# Patient Record
Sex: MALE | Race: WHITE
[De-identification: names, ages, dates, MRNs, and addresses within clinical notes are randomized per-mention and may not be internally consistent; named-entity substitution may affect disease eponyms.]

---

## 2019-06-05 ENCOUNTER — HOSPITAL ENCOUNTER (EMERGENCY)
Dept: HOSPITAL 52 - LL.ED | Age: 24
Discharge: HOME | End: 2019-06-05
Payer: SELF-PAY

## 2019-06-05 DIAGNOSIS — J02.9: Primary | ICD-10-CM

## 2019-06-05 DIAGNOSIS — F15.10: ICD-10-CM

## 2019-06-05 LAB
BARBITURATES UR QL SCN: NEGATIVE
BENZODIAZ UR QL SCN: NEGATIVE
CHLORIDE SERPL-SCNC: 100 MMOL/L (ref 98–107)
SODIUM SERPL-SCNC: 135 MMOL/L (ref 136–145)
TCA UR-MCNC: NEGATIVE UG/ML
THC UR QL SCN>50 NG/ML: NEGATIVE

## 2019-06-05 NOTE — EDM.PDOC
ED HPI GENERAL MEDICAL PROBLEM





- General


Chief Complaint: ENT Problem


Stated Complaint: sore throat, fever


Time Seen by Provider: 06/05/19 13:09


Source of Information: Reports: Patient


History Limitations: Reports: No Limitations





- History of Present Illness


INITIAL COMMENTS - FREE TEXT/NARRATIVE: 





Patient comes to ER complaining of severe sore throat, fever, present since 

Monday. 


No nausea/emesis.  Mild runny nose.  No cough/SOB. No other pain complaint. ROS 

otherwise negative.  Reports that he took 8 OTC Ibuprofen at once. 


Treatments PTA: Reports: NSAIDS





- Related Data


 Allergies











Allergy/AdvReac Type Severity Reaction Status Date / Time


 


No Known Allergies Allergy   Verified 06/05/19 12:51











Home Meds: 


 Home Meds





Penicillin V Potassium 500 mg PO BID #20 tablet 06/05/19 [Rx]











Past Medical History





- Past Health History


Medical/Surgical History: Denies Medical/Surgical History


Psychiatric History: Reports: Addiction (Meth), Anxiety, Other (See Below) (

Self harm/cutting in past)





Social & Family History





- Tobacco Use


Smoking Status *Q: Unknown Ever Smoked





- Recreational Drug Use


Recreational Drug Use: Yes


Recreational Drug Type: Reports: Amphetamines (Speed), Marijuana/Hashish, 

Methamphetamine





ED ROS ENT





- Review of Systems


Review Of Systems: ROS reveals no pertinent complaints other than HPI.





ED EXAM, ENT





- Physical Exam


Exam: See Below


Exam Limited By: No Limitations


General Appearance: Alert, Anxious (extremely anxious, crying about having 

blood draw)


Eye Exam: Bilateral Eye: EOMI, PERRL


Ears: Normal External Exam, Normal Canal, Hearing Grossly Normal, Normal TMs


Nose: Normal Inspection


Mouth/Throat: Normal Lips, Pharyngeal Erythema, Tonsillar Erythema, Tonsillar 

Exudates, Tonsillar Swelling.  No: Peritonsillar Mass, Throat Swelling, Tongue 

Swelling, Uvular Deviation, Uvular Edema


Head: Atraumatic, Normocephalic


Neck: Supple, Non-Tender.  No: Lymphadenopathy (L), Lymphadenopathy (R)


Respiratory/Chest: No Respiratory Distress, Lungs Clear, Normal Breath Sounds, 

No Accessory Muscle Use, Chest Non-Tender


Cardiovascular: No Murmur, Tachycardia


GI/Abdominal: Normal Bowel Sounds, Soft, Non-Tender, No Distention


 (Male) Exam: Deferred


Rectal (Males) Exam: Deferred


Back: No: CVA Tenderness (L), CVA Tenderness (R), Muscle Spasm, Paraspinal 

Tenderness, Vertebral Tenderness


Extremities: Normal Range of Motion, Non-Tender, Slow Capillary Refill (mild)


Neurological: Alert, Oriented, No Motor/Sensory Deficits


Psychiatric: Anxious, Tearful


Skin: Warm, Dry, Intact, Normal Color, No Rash





Course





- Vital Signs


Last Recorded V/S: 


 Last Vital Signs











Temp  39.5 C H  06/05/19 12:54


 


Pulse  109 H  06/05/19 12:54


 


Resp  22 H  06/05/19 12:54


 


BP  108/59 L  06/05/19 12:54


 


Pulse Ox  99   06/05/19 12:54














- Orders/Labs/Meds


Orders: 


 Active Orders 24 hr











 Category Date Time Status


 


 Peripheral IV Care [RC] .AS DIRECTED Care  06/05/19 13:00 Active


 


 Sodium Chloride 0.9% [Saline Flush] Med  06/05/19 12:59 Active





 10 ml FLUSH ASDIRECTED PRN   


 


 Peripheral IV Insertion Adult [OM.PC] Routine Oth  06/05/19 12:59 Ordered








 Medication Orders





Sodium Chloride (Saline Flush)  10 ml FLUSH ASDIRECTED PRN


   PRN Reason: Keep Vein Open








Labs: 


 Laboratory Tests











  06/05/19 06/05/19 06/05/19 Range/Units





  14:15 14:15 14:15 


 


WBC  14.9 H    (4.0-10.2)  K/uL


 


RBC  4.58    (4.33-5.41)  M/uL


 


Hgb  15.0    (13.1-16.8)  g/dL


 


Hct  41.9    (39.0-49.0)  %


 


MCV  91.5    (84.0-98.0)  fL


 


MCH  32.8    (28.2-33.3)  pg


 


MCHC  35.8    (31.7-36.0)  g/dL


 


RDW  12.2    (11.2-14.1)  %


 


Plt Count  183    (150-350)  K/uL


 


Neut % (Auto)  71.8    (45.0-80.0)  %


 


Lymph % (Auto)  15.8    (10.0-50.0)  %


 


Mono % (Auto)  12.1    (2.0-14.0)  %


 


Eos % (Auto)  0.1    (0.0-5.0)  %


 


Baso % (Auto)  0.2    (0.0-2.0)  %


 


Neut # (Auto)  10.65 H    (1.40-7.00)  K/uL


 


Lymph # (Auto)  2.35    (0.50-3.50)  K/uL


 


Mono # (Auto)  1.80 H    (0.00-1.00)  K/uL


 


Eos # (Auto)  0.02    (0.00-0.50)  K/uL


 


Baso # (Auto)  0.03    (0.00-0.20)  K/uL


 


Sodium   135 L   (136-145)  mmol/L


 


Potassium   4.4   (3.5-5.1)  mmol/L


 


Chloride   100   ()  mmol/L


 


Carbon Dioxide   24.7   (21.0-32.0)  mmol/L


 


BUN   12   (7-18)  mg/dL


 


Creatinine   0.97   (0.51-1.17)  mg/dL


 


Est Cr Clr Drug Dosing   TNP   


 


Estimated GFR (MDRD)   > 60   mL/min


 


Glucose   93   ()  mg/dL


 


Calcium   9.4   (8.5-10.1)  mg/dL


 


Total Bilirubin   0.7   (0.2-1.0)  mg/dL


 


AST   25   (15-37)  U/L


 


ALT   29   (12-78)  U/L


 


Alkaline Phosphatase   113   ()  IU/L


 


Total Protein   7.7   (6.4-8.2)  g/dL


 


Albumin   3.4   (3.4-5.0)  g/dL


 


Specimen Type     


 


Urine Color     


 


Urine Appearance     


 


Urine pH     (5.0-9.0)  


 


Ur Specific Gravity     (1.005-1.030)  


 


Urine Protein     (NEGATIVE)  mg/dL


 


Urine Glucose (UA)     (NEGATIVE)  mg/dL


 


Urine Ketones     (NEGATIVE)  mg/dL


 


Urine Occult Blood     (NEGATIVE)  


 


Urine Nitrite     (NEGATIVE)  


 


Urine Bilirubin     (NEGATIVE)  


 


Urine Urobilinogen     (0.2-1.0)  E.U./dL


 


Ur Leukocyte Esterase     (NEGATIVE)  


 


Urine RBC     /HPF


 


Urine WBC     /HPF


 


Ur Epithelial Cells     /LPF


 


Urine Bacteria     (NONE TO FEW)  /HPF


 


Urinalysis Comment     


 


Urine Opiates Screen     (NEGATIVE)  


 


Urine Methadone Screen     (NEGATIVE)  


 


U Acetaminophen Screen     (NEGATIVE)  


 


Ur Barbiturates Screen     (NEGATIVE)  


 


Ur Tricyclics Screen     (NEGATIVE)  


 


Ur Phencyclidine Scrn     (NEGATIVE)  


 


Ur Amphetamine Screen     (NEGATIVE)  


 


U Methamphetamines Scrn     (NEGATIVE)  


 


U Benzodiazepines Scrn     (NEGATIVE)  


 


U Cocaine Metab Screen     (NEGATIVE)  


 


U Marijuana (THC) Screen     (NEGATIVE)  


 


Monoscreen    Negative  (NEGATIVE)  














  06/05/19 06/05/19 Range/Units





  16:40 16:56 


 


WBC    (4.0-10.2)  K/uL


 


RBC    (4.33-5.41)  M/uL


 


Hgb    (13.1-16.8)  g/dL


 


Hct    (39.0-49.0)  %


 


MCV    (84.0-98.0)  fL


 


MCH    (28.2-33.3)  pg


 


MCHC    (31.7-36.0)  g/dL


 


RDW    (11.2-14.1)  %


 


Plt Count    (150-350)  K/uL


 


Neut % (Auto)    (45.0-80.0)  %


 


Lymph % (Auto)    (10.0-50.0)  %


 


Mono % (Auto)    (2.0-14.0)  %


 


Eos % (Auto)    (0.0-5.0)  %


 


Baso % (Auto)    (0.0-2.0)  %


 


Neut # (Auto)    (1.40-7.00)  K/uL


 


Lymph # (Auto)    (0.50-3.50)  K/uL


 


Mono # (Auto)    (0.00-1.00)  K/uL


 


Eos # (Auto)    (0.00-0.50)  K/uL


 


Baso # (Auto)    (0.00-0.20)  K/uL


 


Sodium    (136-145)  mmol/L


 


Potassium    (3.5-5.1)  mmol/L


 


Chloride    ()  mmol/L


 


Carbon Dioxide    (21.0-32.0)  mmol/L


 


BUN    (7-18)  mg/dL


 


Creatinine    (0.51-1.17)  mg/dL


 


Est Cr Clr Drug Dosing    


 


Estimated GFR (MDRD)    mL/min


 


Glucose    ()  mg/dL


 


Calcium    (8.5-10.1)  mg/dL


 


Total Bilirubin    (0.2-1.0)  mg/dL


 


AST    (15-37)  U/L


 


ALT    (12-78)  U/L


 


Alkaline Phosphatase    ()  IU/L


 


Total Protein    (6.4-8.2)  g/dL


 


Albumin    (3.4-5.0)  g/dL


 


Specimen Type   Urinblad  


 


Urine Color   Yellow  


 


Urine Appearance   Clear  


 


Urine pH   8.0  (5.0-9.0)  


 


Ur Specific Gravity   1.015  (1.005-1.030)  


 


Urine Protein   30 H  (NEGATIVE)  mg/dL


 


Urine Glucose (UA)   Negative  (NEGATIVE)  mg/dL


 


Urine Ketones   Negative  (NEGATIVE)  mg/dL


 


Urine Occult Blood   Negative  (NEGATIVE)  


 


Urine Nitrite   Negative  (NEGATIVE)  


 


Urine Bilirubin   Negative  (NEGATIVE)  


 


Urine Urobilinogen   0.2  (0.2-1.0)  E.U./dL


 


Ur Leukocyte Esterase   Negative  (NEGATIVE)  


 


Urine RBC   Not seen  /HPF


 


Urine WBC   0-5  /HPF


 


Ur Epithelial Cells   Occasional  /LPF


 


Urine Bacteria   Few  (NONE TO FEW)  /HPF


 


Urinalysis Comment     


 


Urine Opiates Screen  Positive H   (NEGATIVE)  


 


Urine Methadone Screen  Negative   (NEGATIVE)  


 


U Acetaminophen Screen  Negative   (NEGATIVE)  


 


Ur Barbiturates Screen  Negative   (NEGATIVE)  


 


Ur Tricyclics Screen  Negative   (NEGATIVE)  


 


Ur Phencyclidine Scrn  Negative   (NEGATIVE)  


 


Ur Amphetamine Screen  Negative   (NEGATIVE)  


 


U Methamphetamines Scrn  Positive H   (NEGATIVE)  


 


U Benzodiazepines Scrn  Negative   (NEGATIVE)  


 


U Cocaine Metab Screen  Negative   (NEGATIVE)  


 


U Marijuana (THC) Screen  Negative   (NEGATIVE)  


 


Monoscreen    (NEGATIVE)  











Meds: 


Medications











Generic Name Dose Route Start Last Admin





  Trade Name Freq  PRN Reason Stop Dose Admin


 


Sodium Chloride  10 ml  06/05/19 12:59  





  Saline Flush  FLUSH   





  ASDIRECTED PRN   





  Keep Vein Open   





     





     





     














Discontinued Medications














Generic Name Dose Route Start Last Admin





  Trade Name Freq  PRN Reason Stop Dose Admin


 


Diphenhydramine HCl  50 mg  06/05/19 14:20  06/05/19 14:26





  Benadryl  IVPUSH  06/05/19 14:21  50 mg





  ONETIME ONE   Administration





     





     





     





     


 


Sodium Chloride  1,000 mls @ 999 mls/hr  06/05/19 13:10  06/05/19 14:11





  Normal Saline  IV  06/05/19 14:10  999 mls/hr





  .BOLUS ONE   Administration





     





     





     





     


 


Ceftriaxone Sodium 1 gm/  100 mls @ 200 mls/hr  06/05/19 14:18  06/05/19 14:27





  Sodium Chloride  IV  06/05/19 14:47  200 mls/hr





  ONETIME ONE   Administration





     





     





     





     


 


Sodium Chloride  1,000 mls @ 999 mls/hr  06/05/19 14:52  06/05/19 15:40





  Normal Saline  IV  06/05/19 15:52  999 mls/hr





  .BOLUS ONE   Administration





     





     





     





     


 


Lorazepam  2 mg  06/05/19 13:25  06/05/19 13:28





  Ativan  IM  06/05/19 13:26  2 mg





  ONETIME ONE   Administration





     





     





     





     


 


Methylprednisolone Sodium Succinate  40 mg  06/05/19 14:16  06/05/19 14:26





  Solu-Medrol  IVPUSH  06/05/19 14:17  40 mg





  ONETIME ONE   Administration





     





     





     





     


 


Morphine Sulfate  4 mg  06/05/19 14:20  06/05/19 14:26





  Morphine  IVPUSH  06/05/19 14:21  4 mg





  ONETIME ONE   Administration





     





     





     





     


 


Norflurane  Confirm  06/05/19 13:51  06/05/19 16:31





  Pain Ease Spray  Administered  06/05/19 13:52  Not Given





  Dose   





  103.5 ml   





  .ROUTE   





  .STK-MED ONE   





     





     





     





     














- Re-Assessments/Exams


Free Text/Narrative Re-Assessment/Exam: 





Rapid strep negative.


Patient very anxious and tearful about blood draw and IV for IV fluids.  

Multiple attempts needed to establish an IV due to his tensing up/poor 

cooperation. Ultimately Ativan given to help with his anxiety.





IV established.  IV fluid bolus given.  Also received Benadryl, small dose of 

MS for his pain complaint, and small dose of Solu-Medrol to help with throat 

discomfort. 





CBC showed elevated WBC.  


Patient calmed down and went to sleep.  Additional liter of IV fluids given as 

heart rate remained elevated.  Elevated pulse persisted despite the second 

liter of fluid.  Given this, and his previous behavior, it was suspected that 

he may be under the influence of illicit drugs.  UA was obtained. This was 

positive for Meth. 


Positive result discussed with patient's significant other.  He said that 

patient was acting a bit weird since yesterday and has had issues using Meth in 

the past. 





Plan at this time is to call patient if strep culture positive and prescribe 

antibiotics if indicated.  Encouraged patient to consider drug counseling/

treatment. Precautions reviewed.  To follow up as needed. 








Departure





- Departure


Time of Disposition: 17:44


Disposition: Home, Self-Care 01


Condition: Good


Clinical Impression: 


 Methamphetamine abuse





Pharyngitis


Qualifiers:


 Pharyngitis/tonsillitis etiology: unspecified etiology Qualified Code(s): 

J02.9 - Acute pharyngitis, unspecified








- Discharge Information


*PRESCRIPTION DRUG MONITORING PROGRAM REVIEWED*: Not Applicable


*COPY OF PRESCRIPTION DRUG MONITORING REPORT IN PATIENT [place]: Not Applicable


Prescriptions: 


Penicillin V Potassium 500 mg PO BID #20 tablet


Instructions:  Pharyngitis, Easy-to-Read, Stimulant Use Disorder-

Methamphetamines


Referrals: 


PCP,None [Primary Care Provider] - 


Forms:  ED Department Discharge


Additional Instructions: 


We will call you if your throat culture is positive for strep.  If it is, then 

you will need to fill the antibiotic prescription to treat the strep. 


If this is a virus, antibiotics will not be of help. 


Rest, drink plenty of water. Stay hydrated.  Tylenol can be taken but no more 

than 3 grams per day.  Try to stick to 2 tablets every 6 hours otherwise it is 

hard on the liver.


Do not take more than the recommended dose of Ibuprofen or Naprosyn as it can 

cause bleeding ulcers and kidney damage.


Follow up as needed if you have further problems with the pharyngitis.








Consider treatment/counseling for the meth use/any other drug use.  Continued 

use of illicit drugs such as meth will lead to long term damage of your body 

and possible early death. 





- My Orders


Last 24 Hours: 


My Active Orders





06/05/19 12:59


Sodium Chloride 0.9% [Saline Flush]   10 ml FLUSH ASDIRECTED PRN 


Peripheral IV Insertion Adult [OM.PC] Routine 





06/05/19 13:00


Peripheral IV Care [RC] .AS DIRECTED 














- Assessment/Plan


Last 24 Hours: 


My Active Orders





06/05/19 12:59


Sodium Chloride 0.9% [Saline Flush]   10 ml FLUSH ASDIRECTED PRN 


Peripheral IV Insertion Adult [OM.PC] Routine 





06/05/19 13:00


Peripheral IV Care [RC] .AS DIRECTED